# Patient Record
Sex: FEMALE | Employment: FULL TIME
[De-identification: names, ages, dates, MRNs, and addresses within clinical notes are randomized per-mention and may not be internally consistent; named-entity substitution may affect disease eponyms.]

---

## 2019-10-23 PROBLEM — K20.90 ESOPHAGITIS: Status: ACTIVE | Noted: 2019-10-23

## 2021-09-01 ENCOUNTER — TELEMEDICINE (OUTPATIENT)
Dept: TELEHEALTH | Facility: TELEMEDICINE | Age: 21
End: 2021-09-01
Payer: COMMERCIAL

## 2021-09-01 DIAGNOSIS — N89.8 VAGINAL DISCHARGE: ICD-10-CM

## 2021-09-01 DIAGNOSIS — R30.0 DYSURIA: ICD-10-CM

## 2021-09-01 PROCEDURE — 99204 OFFICE O/P NEW MOD 45 MIN: CPT | Mod: 95 | Performed by: PHYSICIAN ASSISTANT

## 2021-09-01 RX ORDER — NITROFURANTOIN 25; 75 MG/1; MG/1
100 CAPSULE ORAL EVERY 12 HOURS
Qty: 10 CAPSULE | Refills: 0 | Status: SHIPPED | OUTPATIENT
Start: 2021-09-01 | End: 2021-09-06

## 2021-09-01 ASSESSMENT — ENCOUNTER SYMPTOMS
FEVER: 0
NAUSEA: 0
VOMITING: 0
DIARRHEA: 0
CARDIOVASCULAR NEGATIVE: 1
ABDOMINAL PAIN: 0
FLANK PAIN: 0
RESPIRATORY NEGATIVE: 1
CHILLS: 0

## 2021-09-01 NOTE — PROGRESS NOTES
Subjective     Violeta Gomez is a 20 y.o. female who presents with Dysuria    This evaluation was conducted via Zoom using secure and encrypted videoconferencing technology. [facility]    The patient's identity was confirmed and verbal consent was obtained for this virtual visit.               Some abnormal discharge.  No itching, swelling, pain, bleeding or rash.  Patient was recently treated for chlamydia as requesting retest.    Dysuria   This is a new problem. The current episode started in the past 7 days. The problem occurs every urination. The problem has been unchanged. The quality of the pain is described as burning. There has been no fever. The fever has been present for less than 1 day. Associated symptoms include a discharge, frequency and urgency. Pertinent negatives include no chills, flank pain, hematuria, nausea, possible pregnancy or vomiting. She has tried nothing for the symptoms. The treatment provided no relief. There is no history of recurrent UTIs.       PMH:  has no past medical history on file.  MEDS:   Current Outpatient Medications:   •  esomeprazole (NEXIUM) 40 MG delayed-release capsule, TAKE ONE CAPSULE BY MOUTH EVERY MORNING BEFORE BREAKFAST (GENERIC FOR NEXIUM), Disp: 30 capsule, Rfl: 1  •  dicyclomine (BENTYL) 10 MG Cap, Take 1 capsule by mouth 3 times a day., Disp: 60 capsule, Rfl: 2  •  clindamycin (CLEOCIN T) 1 % Gel, Apply to clean skin once a day, Disp: 60 g, Rfl: 6  ALLERGIES:   Allergies   Allergen Reactions   • Amoxicillin    • Pumpkin Flavor      SURGHX: No past surgical history on file.  SOCHX:  reports that she has never smoked. She has never used smokeless tobacco. She reports that she does not drink alcohol and does not use drugs.  FH: family history includes Depression in her mother; Hyperlipidemia in her mother; Hypertension in her mother.    Review of Systems   Constitutional: Negative for chills and fever.    Respiratory: Negative.    Cardiovascular: Negative.    Gastrointestinal: Negative for abdominal pain, diarrhea, nausea and vomiting.   Genitourinary: Positive for dysuria, frequency and urgency. Negative for flank pain and hematuria.       Medications, Allergies, and current problem list reviewed today in Epic         Objective     There were no vitals taken for this visit.     Physical Exam  Vitals and nursing note reviewed.   Constitutional:       General: She is not in acute distress.     Appearance: Normal appearance. She is well-developed. She is not ill-appearing or toxic-appearing.   HENT:      Head: Normocephalic and atraumatic.      Right Ear: External ear normal.      Left Ear: External ear normal.      Nose: Nose normal.   Eyes:      Conjunctiva/sclera: Conjunctivae normal.   Pulmonary:      Effort: Pulmonary effort is normal.   Neurological:      Mental Status: She is alert and oriented to person, place, and time.   Psychiatric:         Mood and Affect: Mood normal.         Behavior: Behavior normal.         Thought Content: Thought content normal.         Judgment: Judgment normal.                             Assessment & Plan         1. Dysuria  POCT Urinalysis    POCT PREGNANCY    Urine Culture    nitrofurantoin (MACROBID) 100 MG Cap   2. Vaginal discharge  VAGINAL PATHOGENS DNA PANEL    CHLAMYDIA/GC PCR URINE OR SWAB     UTI type symptoms.  Some slight discharge.  No vaginal pain, itching, rash.  No fever vomiting, abdominal pain, flank pain.  She will be treated for UTI.  Proper testing ordered.  She will go to lab to complete all testing and I will treat accordingly pending results.  All further questions and concerns answered.  If symptoms worsen or change she will be seen in person in clinic.  Take full course of antibiotic  Urine culture, I will only call patient if I need to change antibiotic pending results  Significantly increase fluids  OTC Motrin or Azo as needed  Cranberry as  tolerated    Return to clinic or go to ED if symptoms worsen or persist. Indications for ED discussed at length. Patient/Parent/Guardian voices understanding. Follow-up with your primary care provider in 3-5 days. Red flag symptoms discussed. All side effects of medication discussed including allergic response, GI upset, tendon injury, rash, sedation etc.    Please note that this dictation was created using voice recognition software. I have made every reasonable attempt to correct obvious errors, but I expect that there are errors of grammar and possibly content that I did not discover before finalizing the note.

## 2021-12-14 PROBLEM — F41.9 ANXIETY: Status: ACTIVE | Noted: 2021-12-14

## 2025-05-01 ENCOUNTER — APPOINTMENT (OUTPATIENT)
Dept: URGENT CARE | Facility: PHYSICIAN GROUP | Age: 25
End: 2025-05-01
Payer: COMMERCIAL

## 2025-05-01 VITALS
HEART RATE: 74 BPM | WEIGHT: 122 LBS | TEMPERATURE: 98.1 F | OXYGEN SATURATION: 98 % | BODY MASS INDEX: 26.32 KG/M2 | RESPIRATION RATE: 16 BRPM | HEIGHT: 57 IN | SYSTOLIC BLOOD PRESSURE: 100 MMHG | DIASTOLIC BLOOD PRESSURE: 56 MMHG

## 2025-05-01 DIAGNOSIS — H60.391 OTHER INFECTIVE ACUTE OTITIS EXTERNA OF RIGHT EAR: ICD-10-CM

## 2025-05-01 PROCEDURE — 3074F SYST BP LT 130 MM HG: CPT | Performed by: NURSE PRACTITIONER

## 2025-05-01 PROCEDURE — 99213 OFFICE O/P EST LOW 20 MIN: CPT | Performed by: NURSE PRACTITIONER

## 2025-05-01 PROCEDURE — 3078F DIAST BP <80 MM HG: CPT | Performed by: NURSE PRACTITIONER

## 2025-05-01 RX ORDER — CIPROFLOXACIN AND DEXAMETHASONE 3; 1 MG/ML; MG/ML
4 SUSPENSION/ DROPS AURICULAR (OTIC) 2 TIMES DAILY
Qty: 2.8 ML | Refills: 0 | Status: SHIPPED | OUTPATIENT
Start: 2025-05-01 | End: 2025-05-08

## 2025-05-01 RX ORDER — NORETHINDRONE ACETATE AND ETHINYL ESTRADIOL AND FERROUS FUMARATE 1MG-20(21)
KIT ORAL
COMMUNITY
Start: 2025-02-28

## 2025-05-01 NOTE — PROGRESS NOTES
Verbal consent was acquired by the patient to use [employer] ambient listening note generation during this visit          Chief Complaint   Patient presents with    Otalgia     Sx 1 day bilateral right ear is worse            History of Present Illness  The patient is a 24-year-old female who presents for evaluation of ear pain.    She has been experiencing bilateral ear discomfort, with the right ear being more affected. Typically, she manages this with Flonase. However, she was awakened abruptly last night due to severe pain in both ears. No recent history of cough or cold symptoms is reported. Her primary care physician has previously attributed her ear issues to allergies, noting only minimal fluid accumulation during examinations. She has been using Flonase and AllerClear from [employer] as part of her allergy management regimen. The intensity of the pain has slightly diminished since its onset.         ROS:    No severe shortness of breath   No cardiac like chest pain, as discussed   As otherwise stated in HPI    Medical/SX/ Social History:  Reviewed per chart    Pertinent Medications:    Current Outpatient Medications on File Prior to Visit   Medication Sig Dispense Refill    JUNEL FE 1/20 1-20 MG-MCG per tablet       esomeprazole (NEXIUM) 40 MG delayed-release capsule Take 1 Capsule by mouth every morning before breakfast. (Patient not taking: Reported on 5/1/2025) 90 Capsule 1    fluticasone (FLONASE) 50 MCG/ACT nasal spray Administer 1 Spray into affected nostril(S) every day. (Patient not taking: Reported on 5/1/2025) 16 g 1    ibuprofen (MOTRIN) 600 MG Tab  (Patient not taking: Reported on 5/1/2025)      tretinoin (RETIN-A) 0.025 % cream Apply pea sized amount to face 2-4 times a week, in the evening, after cleansing face. (Patient not taking: Reported on 5/1/2025) 45 g 0    triamcinolone acetonide (KENALOG) 0.1 % Cream Apply topically to affected area twice daily for 7 days. Avoid applying to face.  (Patient not taking: Reported on 5/1/2025) 45 g 1     No current facility-administered medications on file prior to visit.        Allergies:    Amoxicillin, Bactrim ds, Clindamycin, and Pumpkin flavor     Problem list, medications, and allergies reviewed by myself today in Epic     Physical Exam:    Vitals:    05/01/25 0908   BP: 100/56   Pulse: 74   Resp: 16   Temp: 36.7 °C (98.1 °F)   SpO2: 98%             Physical Exam  Vitals and nursing note reviewed.   Constitutional:       General: She is not in acute distress.     Appearance: Normal appearance. She is not ill-appearing or toxic-appearing.   HENT:      Head: Normocephalic and atraumatic.      Right Ear: Swelling and tenderness present. No drainage. No middle ear effusion. Tympanic membrane is not erythematous or bulging.      Nose: Nose normal.      Mouth/Throat:      Mouth: Mucous membranes are moist.      Pharynx: Oropharynx is clear.   Eyes:      Extraocular Movements: Extraocular movements intact.      Conjunctiva/sclera: Conjunctivae normal.      Pupils: Pupils are equal, round, and reactive to light.   Cardiovascular:      Rate and Rhythm: Normal rate.      Pulses: Normal pulses.   Pulmonary:      Effort: Pulmonary effort is normal.   Musculoskeletal:         General: Normal range of motion.      Cervical back: Normal range of motion.   Skin:     General: Skin is warm.      Capillary Refill: Capillary refill takes less than 2 seconds.   Neurological:      General: No focal deficit present.      Mental Status: She is alert and oriented to person, place, and time.          Medical Decision making and plan :  I personally reviewed prior external notes and test results pertinent to today's visit. Pt is clinically stable at today's acute urgent care visit.  Patient appears nontoxic with no acute distress noted. Appropriate for outpatient care at this time.    Pleasant 24 y.o. female presented clinic with:     Assessment & Plan  1. Otitis externa.  - Reports  experiencing excruciating pain in both ears, with the right ear being worse, which started suddenly in the middle of the night.  - Examination revealed an ear canal infection in the right ear, consistent with otitis externa.  - Ciprodex eardrops will be prescribed and sent to [facility] pharmacy. Advised to continue using Flonase and AllerClear to manage allergies and prevent fluid buildup in the ears.  - If there is no improvement within 3 days, a follow-up consultation is recommended.      Shared decision-making was utilized with patient for treatment plan. Medication discussed included indication for use and the potential benefits and side effects. Education was provided regarding the aforementioned assessments.  Differential Diagnosis, natural history, and supportive care discussed. All of the patient's questions were answered to their satisfaction at the time of discharge. Patient was encouraged to monitor symptoms closely. Those signs and symptoms which would warrant concern and mandate seeking a higher level of service through the emergency department discussed at length.  Patient stated agreement and understanding of this plan of care.    Disposition:  Home in stable condition     Voice Recognition Disclaimer:  Portions of this document were created using voice recognition software and [employer] technology provided by [employer]. The software does have a chance of producing errors of grammar and possibly content. I have made every reasonable attempt to correct obvious errors, but there may be errors of grammar and possibly content that I did not discover before finalizing the  documentation.    KELSEY Paul.

## 2025-07-10 ENCOUNTER — HOSPITAL ENCOUNTER (OUTPATIENT)
Dept: LAB | Facility: MEDICAL CENTER | Age: 25
End: 2025-07-10
Attending: NURSE PRACTITIONER
Payer: COMMERCIAL

## 2025-07-10 PROCEDURE — 83088 ASSAY OF HISTAMINE: CPT

## 2025-07-10 PROCEDURE — 82785 ASSAY OF IGE: CPT | Mod: 91

## 2025-07-10 PROCEDURE — 36415 COLL VENOUS BLD VENIPUNCTURE: CPT

## 2025-07-10 PROCEDURE — 83520 IMMUNOASSAY QUANT NOS NONAB: CPT

## 2025-07-10 PROCEDURE — 86003 ALLG SPEC IGE CRUDE XTRC EA: CPT | Mod: 91

## 2025-07-12 LAB
A ALTERNATA IGE QN: <0.1 KU/L
A FUMIGATUS IGE QN: <0.1 KU/L
ALMOND IGE QN: <0.1 KU/L
AVOCADO IGE QN: 0.14 KU/L
BANANA IGE QN: 0.1 KU/L
BARLEY IGE QN: 4.93 KU/L
BERMUDA GRASS IGE QN: <0.1 KU/L
C SPHAEROSPERMUM IGE QN: <0.1 KU/L
CAT DANDER IGE QN: <0.1 KU/L
CELERY IGE QN: <0.1 KU/L
CHESTNUT IGE QN: <0.1 KU/L
COCONUT IGE QN: <0.1 KU/L
COMMON RAGWEED IGE QN: <0.1 KU/L
CORN IGE QN: 0.54 KU/L
COTTONWOOD IGE QN: <0.1 KU/L
COW HAIR+DANDER IGE QN: <0.1 KU/L
COW MILK IGE QN: 0.66 KU/L
D FARINAE IGE QN: 0.11 KU/L
D PTERONYSS IGE QN: 0.19 KU/L
DEPRECATED MISC ALLERGEN IGE RAST QL: ABNORMAL
DOG DANDER IGE QN: <0.1 KU/L
EGG WHITE IGE QN: 1.73 KU/L
ENGL PLANTAIN IGE QN: <0.1 KU/L
GLUTEN IGE QN: 5.94 KU/L
GOOSEFOOT IGE QN: 0.11 KU/L
GRAPE IGE QN: <0.1 KU/L
HORSE HAIR+DANDER IGE QN: <0.1 KU/L
HOUSE DUST GREER IGE QN: <0.1 KU/L
IGE SERPL-ACNC: 155 KU/L
JOHNSON GRASS IGE QN: <0.1 KU/L
KENT BLUE GRASS IGE QN: <0.1 KU/L
KIWIFRUIT IGE QN: 0.16 KU/L
MESQUITE IGE QN: 0.1 KU/L
MT JUNIPER IGE QN: <0.1 KU/L
MUGWORT IGE QN: <0.1 KU/L
OAT IGE QN: 1.42 KU/L
OLIVE POLN IGE QN: <0.1 KU/L
P NOTATUM IGE QN: <0.1 KU/L
PAPAYA IGE QN: 0.78 KU/L
PEANUT IGE QN: <0.1 KU/L
PECAN/HICK NUT IGE QN: <0.1 KU/L
PENICILLIN G IGE QN: <0.1 KU/L
PENICILLIN V IGE QN: <0.1 KU/L
PER RYE GRASS IGE QN: <0.1 KU/L
POTATO IGE QN: 0.12 KU/L
RICE IGE QN: 0.61 KU/L
RYE IGE QN: 4.57 KU/L
SALTWORT IGE QN: <0.1 KU/L
SESAME SEED IGE QN: <0.1 KU/L
SOYBEAN IGE QN: <0.1 KU/L
TIMOTHY IGE QN: <0.1 KU/L
TOMATO IGE QN: 0.62 KU/L
TRYPTASE SERPL-MCNC: 3.9 UG/L
WATERMELON IGE QN: <0.1 KU/L
WHEAT IGE QN: 6.71 KU/L
WHITE ELM IGE QN: <0.1 KU/L
WHITE OAK IGE QN: <0.1 KU/L

## 2025-07-15 LAB — HISTAMINE SERPL-SCNC: 20 NMOL/L (ref 0–8)
